# Patient Record
Sex: FEMALE | URBAN - METROPOLITAN AREA
[De-identification: names, ages, dates, MRNs, and addresses within clinical notes are randomized per-mention and may not be internally consistent; named-entity substitution may affect disease eponyms.]

---
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
## 2024-11-15 ENCOUNTER — HOSPITAL ENCOUNTER (OUTPATIENT)
Facility: HOSPITAL | Age: 19
Setting detail: SPECIMEN
Discharge: HOME | End: 2024-11-18

## 2024-11-15 LAB — SENTARA SPECIMEN COLLECTION: NORMAL

## 2024-11-15 PROCEDURE — 99001 SPECIMEN HANDLING PT-LAB: CPT

## 2025-08-14 ENCOUNTER — HOSPITAL ENCOUNTER (OUTPATIENT)
Facility: HOSPITAL | Age: 20
Setting detail: SPECIMEN
Discharge: HOME | End: 2025-08-17

## 2025-08-14 LAB — SENTARA SPECIMEN COLLECTION: NORMAL

## 2025-08-14 PROCEDURE — 99001 SPECIMEN HANDLING PT-LAB: CPT
